# Patient Record
Sex: FEMALE | Race: WHITE | NOT HISPANIC OR LATINO | Employment: OTHER | URBAN - METROPOLITAN AREA
[De-identification: names, ages, dates, MRNs, and addresses within clinical notes are randomized per-mention and may not be internally consistent; named-entity substitution may affect disease eponyms.]

---

## 2017-01-09 ENCOUNTER — OFFICE VISIT (OUTPATIENT)
Dept: FAMILY MEDICINE | Facility: CLINIC | Age: 82
End: 2017-01-09
Payer: MEDICARE

## 2017-01-09 VITALS
BODY MASS INDEX: 26.55 KG/M2 | SYSTOLIC BLOOD PRESSURE: 134 MMHG | TEMPERATURE: 99 F | WEIGHT: 154.63 LBS | HEART RATE: 103 BPM | DIASTOLIC BLOOD PRESSURE: 75 MMHG

## 2017-01-09 DIAGNOSIS — J40 BRONCHITIS: Primary | ICD-10-CM

## 2017-01-09 PROCEDURE — 99213 OFFICE O/P EST LOW 20 MIN: CPT | Mod: S$PBB,,, | Performed by: FAMILY MEDICINE

## 2017-01-09 PROCEDURE — 99999 PR PBB SHADOW E&M-EST. PATIENT-LVL III: CPT | Mod: PBBFAC,,, | Performed by: FAMILY MEDICINE

## 2017-01-09 PROCEDURE — 99213 OFFICE O/P EST LOW 20 MIN: CPT | Mod: PBBFAC,PO | Performed by: FAMILY MEDICINE

## 2017-01-09 RX ORDER — CODEINE PHOSPHATE AND GUAIFENESIN 10; 100 MG/5ML; MG/5ML
5 SOLUTION ORAL EVERY 6 HOURS PRN
Qty: 180 ML | Refills: 0 | Status: SHIPPED | OUTPATIENT
Start: 2017-01-09 | End: 2017-01-19

## 2017-01-09 RX ORDER — AZITHROMYCIN 250 MG/1
250 TABLET, FILM COATED ORAL DAILY
Qty: 6 TABLET | Refills: 0 | Status: SHIPPED | OUTPATIENT
Start: 2017-01-09 | End: 2017-01-14

## 2017-01-09 NOTE — PROGRESS NOTES
Starr Ramos presents with moderate upper respiratory congestion,rhinnorhea,moderate cough past 2-3 days. OTC help slightly. Denies nausea,vomiting,diarrhea or significant fever.    Past Medical History   Diagnosis Date    Diverticulosis     Gallstones     Glaucoma (increased eye pressure)     Hyperlipemia     Thyroid disease      Past Surgical History   Procedure Laterality Date    Hysterectomy      Appendectomy      Cataract extraction w/  intraocular lens implant       OU    Cholecystectomy       Review of patient's allergies indicates:   Allergen Reactions    Augmentin [amoxicillin-pot clavulanate] Nausea And Vomiting    Brimonidine      Other reaction(s): Swelling    Cosopt  [dorzolamide-timolol] Itching and Swelling     Other reaction(s): Eye irritation     Current Outpatient Prescriptions on File Prior to Visit   Medication Sig Dispense Refill    acetaZOLAMIDE (DIAMOX) 500 mg CpSR Take 500 mg by mouth 2 (two) times daily.  1    amitriptyline (ELAVIL) 25 MG tablet TAKE 1 TABLET IN THE EVENING 90 tablet 3    ascorbic acid (VITAMIN C) 250 MG tablet Every day      BETA-CAROTENE,A, W-C & E/MIN (OCUVITE ORAL) Every day      bimatoprost (LUMIGAN) 0.03 % ophthalmic drops At bedtime      COMBIGAN 0.2-0.5 % Drop Place 1 drop into both eyes 2 (two) times daily.  5    cyanocobalamin, vitamin B-12, (VITAMIN B-12) 1,000 mcg TbSR Every day      dorzolamide (TRUSOPT) 2 % ophthalmic solution Place 1 drop into both eyes 2 (two) times daily.  5    folic acid (FOLVITE) 400 MCG tablet Every day      levothyroxine (SYNTHROID) 75 MCG tablet TAKE 1 TABLET EVERY DAY 90 tablet 0    omeprazole (PRILOSEC) 40 MG capsule Take 1 capsule (40 mg total) by mouth once daily. 90 capsule 3    oxybutynin (DITROPAN) 5 MG Tab Take 1 tablet (5 mg total) by mouth every evening. 90 tablet 3    paroxetine (PAXIL) 10 MG tablet TAKE 1 TABLET EVERY MORNING 90 tablet 3    sucralfate (CARAFATE) 1 gram tablet Take 1 tablet (1  g total) by mouth 4 (four) times daily. 120 tablet 11    timolol maleate 0.5% (TIMOPTIC) 0.5 % Drop Place 1 drop into both eyes 2 (two) times daily.  0     No current facility-administered medications on file prior to visit.      Social History     Social History    Marital status:      Spouse name: N/A    Number of children: N/A    Years of education: N/A     Occupational History    Not on file.     Social History Main Topics    Smoking status: Never Smoker    Smokeless tobacco: Never Used    Alcohol use Yes      Comment: rare    Drug use: No    Sexual activity: Not on file     Other Topics Concern    Not on file     Social History Narrative     Family History   Problem Relation Age of Onset    Aneurysm Mother     Heart attack Mother 93    Liver cancer Mother     Aneurysm Sister     Aneurysm Brother     Heart attack Father 75    Diabetes Neg Hx     Hypertension Neg Hx     Stroke Neg Hx          ROS:  SKIN: No rashes, itching or changes in color or texture of skin.  EYES: Visual acuity fine. No photophobia, ocular pain or diplopia.EARS: Denies ear pain, discharge or vertigo.NOSE: No loss of smell, no epistaxis some postnasal drip.MOUTH & THROAT: No hoarseness or change in voice. No excessive gum bleeding.CHEST: Denies FERNANDEZ, cyanosis, wheezing  CARDIOVASCULAR: Denies chest pain, PND, orthopnea or reduced exercise tolerance.  ABDOMEN:  No weight loss.No abdominal pain, no hematemesis or blood in stool.  URINARY: No flank pain, dysuria or hematuria.  PERIPHERAL VASCULAR: No claudication or cyanosis.  MUSCULOSKELETAL: Negative   NEUROLOGIC: No history of seizures, paralysis, alteration of gait or coordination.    PE: Vital signs as noted  Heent:Normocephalic with no recent cranial trauma,PERRLA,EOMI,conjunctiva clear,fundi reveal no hemmorhage exudate or papilledema.Otic canals clear, tympanic membranes slightly dull bilaterally.Nasal mucosa slightly red and edematous.Posterior pharynx slightly  red but without exudate.  Neck:Supple with minimal anterior cervical adenopathy.  Chest:Clear bilateral breath sounds with mild scattered ronchi  Heart:Regular rhthym without murmer  Abdomen:Soft, non tender,no masses, no hepatosplenomegalyExtremeties and Neurologic:Grossly within normal limits  Impression: Upper Respiratory Infection. 465.9  Plan:

## 2017-01-12 ENCOUNTER — TELEPHONE (OUTPATIENT)
Dept: FAMILY MEDICINE | Facility: CLINIC | Age: 82
End: 2017-01-12

## 2017-01-12 RX ORDER — CIPROFLOXACIN 500 MG/1
500 TABLET ORAL 2 TIMES DAILY
Qty: 14 TABLET | Refills: 0 | Status: SHIPPED | OUTPATIENT
Start: 2017-01-12 | End: 2017-01-19

## 2017-01-12 NOTE — TELEPHONE ENCOUNTER
Pt states she has 1 antibiotic left and she is still coughing a lot. She is asking what else she should do? She was seen on 1/9/17

## 2017-01-12 NOTE — TELEPHONE ENCOUNTER
----- Message from Yohana Alejandre sent at 1/12/2017  3:33 PM CST -----  Pt at [phone]//states she saw Dr Sultana on Monday//she has bronchitis//still coughing real bad//would like for you to call her//she only has one pill left of her antibiotic//please call asap//thanks/lh

## 2017-01-19 ENCOUNTER — HOSPITAL ENCOUNTER (OUTPATIENT)
Dept: RADIOLOGY | Facility: HOSPITAL | Age: 82
Discharge: HOME | End: 2017-01-19
Payer: MEDICARE

## 2017-01-19 ENCOUNTER — OFFICE VISIT (OUTPATIENT)
Dept: FAMILY MEDICINE | Facility: CLINIC | Age: 82
End: 2017-01-19
Payer: MEDICARE

## 2017-01-19 VITALS
BODY MASS INDEX: 26.39 KG/M2 | HEIGHT: 64 IN | SYSTOLIC BLOOD PRESSURE: 126 MMHG | TEMPERATURE: 98 F | OXYGEN SATURATION: 98 % | DIASTOLIC BLOOD PRESSURE: 73 MMHG | HEART RATE: 78 BPM | WEIGHT: 154.56 LBS

## 2017-01-19 DIAGNOSIS — J40 BRONCHITIS: ICD-10-CM

## 2017-01-19 DIAGNOSIS — J40 BRONCHITIS: Primary | ICD-10-CM

## 2017-01-19 DIAGNOSIS — J18.9 PNEUMONIA OF LEFT LOWER LOBE DUE TO INFECTIOUS ORGANISM: Primary | ICD-10-CM

## 2017-01-19 PROCEDURE — 71020 XR CHEST PA AND LATERAL: CPT | Mod: 26,,, | Performed by: RADIOLOGY

## 2017-01-19 PROCEDURE — 71020 XR CHEST PA AND LATERAL: CPT | Mod: TC,PO

## 2017-01-19 PROCEDURE — 99213 OFFICE O/P EST LOW 20 MIN: CPT | Mod: S$PBB,,,

## 2017-01-19 PROCEDURE — 99999 PR PBB SHADOW E&M-EST. PATIENT-LVL IV: CPT | Mod: PBBFAC,,,

## 2017-01-19 RX ORDER — METHYLPREDNISOLONE ACETATE 80 MG/ML
80 INJECTION, SUSPENSION INTRA-ARTICULAR; INTRALESIONAL; INTRAMUSCULAR; SOFT TISSUE
Status: COMPLETED | OUTPATIENT
Start: 2017-01-19 | End: 2017-01-19

## 2017-01-19 RX ORDER — LEVOFLOXACIN 500 MG/1
500 TABLET, FILM COATED ORAL DAILY
Qty: 10 TABLET | Refills: 0 | Status: SHIPPED | OUTPATIENT
Start: 2017-01-19 | End: 2017-01-29

## 2017-01-19 RX ORDER — HYDROCODONE POLISTIREX AND CHLORPHENIRAMINE POLISTIREX 10; 8 MG/5ML; MG/5ML
5 SUSPENSION, EXTENDED RELEASE ORAL EVERY 12 HOURS PRN
Qty: 115 ML | Refills: 0 | Status: SHIPPED | OUTPATIENT
Start: 2017-01-19 | End: 2017-01-29

## 2017-01-19 RX ADMIN — METHYLPREDNISOLONE ACETATE 80 MG: 80 INJECTION, SUSPENSION INTRALESIONAL; INTRAMUSCULAR; INTRASYNOVIAL; SOFT TISSUE at 12:01

## 2017-01-19 NOTE — PROGRESS NOTES
Subjective:       Patient ID: Starr Ramos is a 86 y.o. female.    Chief Complaint: Cough    HPI     The patient presents today with a 13 day complaint of a cough that she says is constant and moderate to severe in intensity.  She voices associated nasal congestion and rhinorrhea.  She denies any fever, shortness breath, or wheezing.  The patient has completed both a Z-Ryan and a course of Cipro.  She says her symptoms have not improved at all.  She cannot identify exacerbating or mitigating factors.     Review of Systems   Constitutional: Negative for activity change, appetite change, fatigue, fever and unexpected weight change.   HENT: Positive for congestion and rhinorrhea.    Eyes: Negative.    Respiratory: Positive for cough. Negative for chest tightness, shortness of breath and wheezing.    Cardiovascular: Negative for chest pain, palpitations and leg swelling.   Gastrointestinal: Negative for constipation, diarrhea, nausea and vomiting.   Endocrine: Negative.    Genitourinary: Negative.    Musculoskeletal: Negative.    Skin: Negative for color change.   Allergic/Immunologic: Negative.    Neurological: Negative for dizziness, weakness and light-headedness.   Hematological: Negative.    Psychiatric/Behavioral: Negative for sleep disturbance.         Objective:      Physical Exam   Constitutional: She is oriented to person, place, and time. She appears well-developed and well-nourished. No distress.   HENT:   Head: Normocephalic and atraumatic. Hair is normal.   Right Ear: Hearing, tympanic membrane, external ear and ear canal normal.   Left Ear: Hearing, tympanic membrane, external ear and ear canal normal.   Nose: No mucosal edema, rhinorrhea, nose lacerations, sinus tenderness, nasal deformity, septal deviation or nasal septal hematoma. No epistaxis.  No foreign bodies. Right sinus exhibits no maxillary sinus tenderness and no frontal sinus tenderness. Left sinus exhibits no maxillary sinus tenderness and  no frontal sinus tenderness.   Mouth/Throat: Uvula is midline, oropharynx is clear and moist and mucous membranes are normal.   Eyes: Conjunctivae are normal. Pupils are equal, round, and reactive to light. Right eye exhibits no discharge. Left eye exhibits no discharge.   Neck: Trachea normal, normal range of motion and phonation normal. Neck supple. No tracheal deviation present.   Cardiovascular: Normal rate, regular rhythm, normal heart sounds and intact distal pulses.  Exam reveals no gallop and no friction rub.    No murmur heard.  Pulmonary/Chest: Effort normal. No respiratory distress. She has no decreased breath sounds. She has no wheezes. She has no rhonchi. She has rales in the left middle field. She exhibits no tenderness.   Musculoskeletal: Normal range of motion.   Lymphadenopathy:        Head (right side): No submental, no submandibular, no tonsillar, no preauricular, no posterior auricular and no occipital adenopathy present.        Head (left side): No submental, no submandibular, no tonsillar, no preauricular, no posterior auricular and no occipital adenopathy present.     She has no cervical adenopathy.        Right cervical: No superficial cervical, no deep cervical and no posterior cervical adenopathy present.       Left cervical: No superficial cervical, no deep cervical and no posterior cervical adenopathy present.   Neurological: She is alert and oriented to person, place, and time. She exhibits normal muscle tone. GCS eye subscore is 4. GCS verbal subscore is 5. GCS motor subscore is 6.   Skin: Skin is warm and dry. No rash noted. She is not diaphoretic. No erythema. No pallor.   Psychiatric: She has a normal mood and affect. Her speech is normal and behavior is normal. Judgment and thought content normal.       Assessment:       1. Bronchitis          Plan:   Bronchitis  -     methylPREDNISolone acetate injection 80 mg; Inject 1 mL (80 mg total) into the muscle one time.  -      hydrocodone-chlorpheniramine (TUSSIONEX) 10-8 mg/5 mL suspension; Take 5 mLs by mouth every 12 (twelve) hours as needed.  Dispense: 115 mL; Refill: 0  -     X-Ray Chest PA And Lateral; Future; Expected date: 1/19/17  -     levoFLOXacin (LEVAQUIN) 500 MG tablet; Take 1 tablet (500 mg total) by mouth once daily.  Dispense: 10 tablet; Refill: 0            Disclaimer: This note is prepared using voice recognition software.  As such there may be errors in the dictation.  It has not been proofread.

## 2017-01-19 NOTE — MR AVS SNAPSHOT
[facility]  [address]  [place] [address]  Phone: [phone]  Fax: [phone]                  Starr Ramos   2017 12:00 PM   Office Visit    Description:  Female : 1930   Provider:  ERNESTO Cole   Department:  [facility]           Reason for Visit     Cough           Diagnoses this Visit        Comments    Bronchitis    -  Primary            To Do List           Future Appointments        Provider Department Dept Phone    2017 1:00 PM [facility] XR[facility] [phone]      Goals (5 Years of Data)     None       These Medications        Disp Refills Start End    hydrocodone-chlorpheniramine (TUSSIONEX) 10-8 mg/5 mL suspension 115 mL 0 2017    Take 5 mLs by mouth every 12 (twelve) hours as needed. - Oral    Pharmacy: [facility]/pharmacy #[id number] - [place] - [address] Ph #: [phone]         [facility]     [facility] -  Assistance  Registered nurses in the [facility] provide clinical advisement, health education, appointment booking, and other advisory services.  Call for this free service at [phone].             Medications           Message regarding Medications     Verify the changes and/or additions to your medication regime listed below are the same as discussed with your clinician today.  If any of these changes or additions are incorrect, please notify your healthcare provider.        START taking these NEW medications        Refills    hydrocodone-chlorpheniramine (TUSSIONEX) 10-8 mg/5 mL suspension 0    Sig: Take 5 mLs by mouth every 12 (twelve) hours as needed.    Class: Normal    Route: Oral      These medications were administered today        Dose Freq    methylPREDNISolone acetate injection 80 mg 80 mg Clinic/HOD 1 time    Sig: Inject 1 mL (80 mg total) into the muscle one time.    Class:  Normal    Route: Intramuscular      STOP taking these medications     guaifenesin-codeine 100-10 mg/5 ml (TUSSI-ORGANIDIN NR)  mg/5 mL syrup Take 5 mLs by mouth every 6 (six) hours as needed for Cough.           Verify that the below list of medications is an accurate representation of the medications you are currently taking.  If none reported, the list may be blank. If incorrect, please contact your healthcare provider. Carry this list with you in case of emergency.           Current Medications     acetaZOLAMIDE (DIAMOX) 500 mg CpSR Take 500 mg by mouth 2 (two) times daily.    amitriptyline (ELAVIL) 25 MG tablet TAKE 1 TABLET IN THE EVENING    ascorbic acid (VITAMIN C) 250 MG tablet Every day    BETA-CAROTENE,A, W-C & E/MIN (OCUVITE ORAL) Every day    bimatoprost (LUMIGAN) 0.03 % ophthalmic drops At bedtime    COMBIGAN 0.2-0.5 % Drop Place 1 drop into both eyes 2 (two) times daily.    cyanocobalamin, vitamin B-12, (VITAMIN B-12) 1,000 mcg TbSR Every day    dorzolamide (TRUSOPT) 2 % ophthalmic solution Place 1 drop into both eyes 2 (two) times daily.    folic acid (FOLVITE) 400 MCG tablet Every day    levothyroxine (SYNTHROID) 75 MCG tablet TAKE 1 TABLET EVERY DAY    omeprazole (PRILOSEC) 40 MG capsule Take 1 capsule (40 mg total) by mouth once daily.    paroxetine (PAXIL) 10 MG tablet TAKE 1 TABLET EVERY MORNING    sucralfate (CARAFATE) 1 gram tablet Take 1 tablet (1 g total) by mouth 4 (four) times daily.    timolol maleate 0.5% (TIMOPTIC) 0.5 % Drop Place 1 drop into both eyes 2 (two) times daily.    ciprofloxacin HCl (CIPRO) 500 MG tablet Take 1 tablet (500 mg total) by mouth 2 (two) times daily.    hydrocodone-chlorpheniramine (TUSSIONEX) 10-8 mg/5 mL suspension Take 5 mLs by mouth every 12 (twelve) hours as needed.    oxybutynin (DITROPAN) 5 MG Tab Take 1 tablet (5 mg total) by mouth every evening.           Clinical Reference Information           Vital Signs - Last Recorded  Most recent update:  "1/19/2017 12:33 PM by Felicitas Kumar MA    BP Pulse Temp Ht Wt SpO2    126/73 78 97.9 °F (36.6 °C) (Oral) 5' 4" (1.626 m) 70.1 kg (154 lb 8.7 oz) 98%    BMI                26.53 kg/m2          Blood Pressure          Most Recent Value    BP  126/73      Allergies as of 1/19/2017     Augmentin [Amoxicillin-pot Clavulanate]    Brimonidine    Cosopt  [Dorzolamide-timolol]      Immunizations Administered on Date of Encounter - 1/19/2017     None      Orders Placed During Today's Visit     Future Labs/Procedures Expected by Expires    X-Ray Chest PA And Lateral  1/19/2017 1/20/2018      Administrations This Visit     methylPREDNISolone acetate injection 80 mg     Admin Date Action Dose Route Administered By             01/19/2017 Given 80 mg Intramuscular Awilda Scales LPN                      "

## 2017-01-30 ENCOUNTER — TELEPHONE (OUTPATIENT)
Dept: FAMILY MEDICINE | Facility: CLINIC | Age: 82
End: 2017-01-30

## 2017-01-30 DIAGNOSIS — Z23 IMMUNIZATION DUE: Primary | ICD-10-CM

## 2017-01-30 NOTE — TELEPHONE ENCOUNTER
----- Message from Sloane Gibson sent at 1/30/2017  9:37 AM CST -----  Contact: Starr  Patient is asking to speak to Mary Anne regarding the appointment as per Alcides Bacon for x-ray to be done. Please call [phone]. Thanks!

## 2017-01-31 ENCOUNTER — HOSPITAL ENCOUNTER (OUTPATIENT)
Dept: RADIOLOGY | Facility: HOSPITAL | Age: 82
Discharge: HOME | End: 2017-01-31
Payer: MEDICARE

## 2017-01-31 ENCOUNTER — CLINICAL SUPPORT (OUTPATIENT)
Dept: FAMILY MEDICINE | Facility: CLINIC | Age: 82
End: 2017-01-31
Payer: MEDICARE

## 2017-01-31 DIAGNOSIS — J18.9 PNEUMONIA OF LEFT LOWER LOBE DUE TO INFECTIOUS ORGANISM: ICD-10-CM

## 2017-01-31 DIAGNOSIS — Z23 NEED FOR VACCINATION WITH 13-POLYVALENT PNEUMOCOCCAL CONJUGATE VACCINE: Primary | ICD-10-CM

## 2017-01-31 PROCEDURE — 71020 XR CHEST PA AND LATERAL: CPT | Mod: 26,,, | Performed by: RADIOLOGY

## 2017-01-31 PROCEDURE — 90670 PCV13 VACCINE IM: CPT | Mod: PBBFAC,PO

## 2017-01-31 NOTE — PROGRESS NOTES
Pt instructed to wait for 15 minutes in patient waiting area to monitor for side effects.  Pt received Prevnar 13 injection IM to right deltoid.  Pt tolerated well

## 2017-02-03 ENCOUNTER — TELEPHONE (OUTPATIENT)
Dept: FAMILY MEDICINE | Facility: CLINIC | Age: 82
End: 2017-02-03

## 2017-02-03 NOTE — TELEPHONE ENCOUNTER
No active pneumonia, area of scarring which is an old finding. If she's feeling well no fu needed for this-if still out of breath rec Pulm con

## 2017-02-03 NOTE — TELEPHONE ENCOUNTER
----- Message from Jinny Branham sent at 2/3/2017  8:59 AM CST -----  results needed...[phone] (home)

## 2017-03-22 RX ORDER — PAROXETINE 10 MG/1
TABLET, FILM COATED ORAL
Qty: 90 TABLET | Refills: 3 | Status: SHIPPED | OUTPATIENT
Start: 2017-03-22 | End: 2018-04-19 | Stop reason: SDUPTHER

## 2017-04-15 RX ORDER — AMITRIPTYLINE HYDROCHLORIDE 25 MG/1
TABLET, FILM COATED ORAL
Qty: 90 TABLET | Refills: 3 | Status: SHIPPED | OUTPATIENT
Start: 2017-04-15 | End: 2018-04-05 | Stop reason: SDUPTHER

## 2017-07-09 RX ORDER — LEVOTHYROXINE SODIUM 75 UG/1
TABLET ORAL
Qty: 90 TABLET | Refills: 4 | Status: SHIPPED | OUTPATIENT
Start: 2017-07-09 | End: 2017-07-31

## 2017-07-31 ENCOUNTER — OFFICE VISIT (OUTPATIENT)
Dept: FAMILY MEDICINE | Facility: CLINIC | Age: 82
End: 2017-07-31
Payer: MEDICARE

## 2017-07-31 VITALS
DIASTOLIC BLOOD PRESSURE: 75 MMHG | HEIGHT: 64 IN | SYSTOLIC BLOOD PRESSURE: 137 MMHG | BODY MASS INDEX: 25.35 KG/M2 | HEART RATE: 84 BPM | WEIGHT: 148.5 LBS | TEMPERATURE: 98 F

## 2017-07-31 DIAGNOSIS — J06.9 UPPER RESPIRATORY TRACT INFECTION, UNSPECIFIED TYPE: Primary | ICD-10-CM

## 2017-07-31 PROCEDURE — 1157F ADVNC CARE PLAN IN RCRD: CPT | Mod: ,,, | Performed by: FAMILY MEDICINE

## 2017-07-31 PROCEDURE — 1159F MED LIST DOCD IN RCRD: CPT | Mod: ,,, | Performed by: FAMILY MEDICINE

## 2017-07-31 PROCEDURE — 1126F AMNT PAIN NOTED NONE PRSNT: CPT | Mod: ,,, | Performed by: FAMILY MEDICINE

## 2017-07-31 PROCEDURE — 99213 OFFICE O/P EST LOW 20 MIN: CPT | Mod: S$PBB,,, | Performed by: FAMILY MEDICINE

## 2017-07-31 PROCEDURE — 99213 OFFICE O/P EST LOW 20 MIN: CPT | Mod: PBBFAC,PO | Performed by: FAMILY MEDICINE

## 2017-07-31 PROCEDURE — 96372 THER/PROPH/DIAG INJ SC/IM: CPT | Mod: PBBFAC,PO

## 2017-07-31 PROCEDURE — 99999 PR PBB SHADOW E&M-EST. PATIENT-LVL III: CPT | Mod: PBBFAC,,, | Performed by: FAMILY MEDICINE

## 2017-07-31 RX ORDER — DEXAMETHASONE SODIUM PHOSPHATE 4 MG/ML
8 INJECTION, SOLUTION INTRA-ARTICULAR; INTRALESIONAL; INTRAMUSCULAR; INTRAVENOUS; SOFT TISSUE ONCE
Status: COMPLETED | OUTPATIENT
Start: 2017-07-31 | End: 2017-07-31

## 2017-07-31 RX ORDER — MONTELUKAST SODIUM 10 MG/1
10 TABLET ORAL NIGHTLY
Qty: 30 TABLET | Refills: 11 | Status: SHIPPED | OUTPATIENT
Start: 2017-07-31 | End: 2017-08-30

## 2017-07-31 RX ORDER — CODEINE PHOSPHATE AND GUAIFENESIN 10; 100 MG/5ML; MG/5ML
5 SOLUTION ORAL EVERY 6 HOURS PRN
Qty: 240 ML | Refills: 0 | Status: SHIPPED | OUTPATIENT
Start: 2017-07-31 | End: 2017-08-10

## 2017-07-31 RX ADMIN — DEXAMETHASONE SODIUM PHOSPHATE 8 MG: 4 INJECTION, SOLUTION INTRAMUSCULAR; INTRAVENOUS at 10:07

## 2017-07-31 NOTE — PROGRESS NOTES
Starr Ramos presents with moderate upper respiratory congestion,rhinnorhea,moderate cough past 2-3 days. OTC help slightly. Denies nausea,vomiting,diarrhea or significant fever.    Past Medical History:   Diagnosis Date    Diverticulosis     Gallstones     Glaucoma (increased eye pressure)     Hyperlipemia     Thyroid disease      Past Surgical History:   Procedure Laterality Date    APPENDECTOMY      CATARACT EXTRACTION W/  INTRAOCULAR LENS IMPLANT      OU    CHOLECYSTECTOMY      HYSTERECTOMY       Review of patient's allergies indicates:   Allergen Reactions    Augmentin [amoxicillin-pot clavulanate] Nausea And Vomiting    Brimonidine      Other reaction(s): Swelling    Cosopt  [dorzolamide-timolol] Itching and Swelling     Other reaction(s): Eye irritation     Current Outpatient Prescriptions on File Prior to Visit   Medication Sig Dispense Refill    acetaZOLAMIDE (DIAMOX) 500 mg CpSR Take 500 mg by mouth 2 (two) times daily.  1    amitriptyline (ELAVIL) 25 MG tablet TAKE 1 TABLET IN THE EVENING 90 tablet 3    ascorbic acid (VITAMIN C) 250 MG tablet Every day      BETA-CAROTENE,A, W-C & E/MIN (OCUVITE ORAL) Every day      bimatoprost (LUMIGAN) 0.03 % ophthalmic drops At bedtime      COMBIGAN 0.2-0.5 % Drop Place 1 drop into both eyes 2 (two) times daily.  5    cyanocobalamin, vitamin B-12, (VITAMIN B-12) 1,000 mcg TbSR Every day      dorzolamide (TRUSOPT) 2 % ophthalmic solution Place 1 drop into both eyes 2 (two) times daily.  5    folic acid (FOLVITE) 400 MCG tablet Every day      levothyroxine (SYNTHROID) 75 MCG tablet TAKE 1 TABLET EVERY DAY 90 tablet 0    omeprazole (PRILOSEC) 40 MG capsule Take 1 capsule (40 mg total) by mouth once daily. 90 capsule 3    paroxetine (PAXIL) 10 MG tablet TAKE 1 TABLET EVERY MORNING 90 tablet 3    sucralfate (CARAFATE) 1 gram tablet Take 1 tablet (1 g total) by mouth 4 (four) times daily. 120 tablet 11    timolol maleate 0.5% (TIMOPTIC) 0.5 %  Drop Place 1 drop into both eyes 2 (two) times daily.  0    oxybutynin (DITROPAN) 5 MG Tab Take 1 tablet (5 mg total) by mouth every evening. 90 tablet 3    [DISCONTINUED] levothyroxine (SYNTHROID) 75 MCG tablet TAKE 1 TABLET EVERY DAY 90 tablet 4     No current facility-administered medications on file prior to visit.      Social History     Social History    Marital status:      Spouse name: N/A    Number of children: N/A    Years of education: N/A     Occupational History    Not on file.     Social History Main Topics    Smoking status: Never Smoker    Smokeless tobacco: Never Used    Alcohol use Yes      Comment: rare    Drug use: No    Sexual activity: Not on file     Other Topics Concern    Not on file     Social History Narrative    No narrative on file     Family History   Problem Relation Age of Onset    Aneurysm Mother     Heart attack Mother 93    Liver cancer Mother     Aneurysm Sister     Aneurysm Brother     Heart attack Father 75    Diabetes Neg Hx     Hypertension Neg Hx     Stroke Neg Hx          ROS:  SKIN: No rashes, itching or changes in color or texture of skin.  EYES: Visual acuity fine. No photophobia, ocular pain or diplopia.EARS: Denies ear pain, discharge or vertigo.NOSE: No loss of smell, no epistaxis some postnasal drip.MOUTH & THROAT: No hoarseness or change in voice. No excessive gum bleeding.CHEST: Denies FERNANDEZ, cyanosis, wheezing  CARDIOVASCULAR: Denies chest pain, PND, orthopnea or reduced exercise tolerance.  ABDOMEN:  No weight loss.No abdominal pain, no hematemesis or blood in stool.  URINARY: No flank pain, dysuria or hematuria.  PERIPHERAL VASCULAR: No claudication or cyanosis.  MUSCULOSKELETAL: Negative   NEUROLOGIC: No history of seizures, paralysis, alteration of gait or coordination.    PE: Vital signs as noted  Heent:Normocephalic with no recent cranial trauma,PERRLA,EOMI,conjunctiva clear,fundi reveal no hemmorhage exudate or papilledema.Otic  canals clear, tympanic membranes slightly dull bilaterally.Nasal mucosa slightly red and edematous.Posterior pharynx slightly red but without exudate.  Neck:Supple with minimal anterior cervical adenopathy.  Chest:Clear bilateral breath sounds with mild scattered ronchi  Heart:Regular rhthym without murmer  Abdomen:Soft, non tender,no masses, no hepatosplenomegalyExtremeties and Neurologic:Grossly within normal limits  Impression: Upper Respiratory Infection. 465.9  Plan:   Singulair Dexa 8 TO w cod

## 2017-08-03 ENCOUNTER — OFFICE VISIT (OUTPATIENT)
Dept: FAMILY MEDICINE | Facility: CLINIC | Age: 82
End: 2017-08-03
Payer: MEDICARE

## 2017-08-03 ENCOUNTER — HOSPITAL ENCOUNTER (OUTPATIENT)
Dept: RADIOLOGY | Facility: HOSPITAL | Age: 82
Discharge: HOME | End: 2017-08-03
Attending: FAMILY MEDICINE
Payer: MEDICARE

## 2017-08-03 VITALS
OXYGEN SATURATION: 96 % | DIASTOLIC BLOOD PRESSURE: 67 MMHG | HEART RATE: 111 BPM | SYSTOLIC BLOOD PRESSURE: 119 MMHG | TEMPERATURE: 100 F | HEIGHT: 66 IN | BODY MASS INDEX: 24.1 KG/M2 | WEIGHT: 149.94 LBS

## 2017-08-03 DIAGNOSIS — J40 BRONCHITIS: Primary | ICD-10-CM

## 2017-08-03 DIAGNOSIS — J40 BRONCHITIS: ICD-10-CM

## 2017-08-03 PROCEDURE — 71020 XR CHEST PA AND LATERAL: CPT | Mod: TC,PO

## 2017-08-03 PROCEDURE — 3008F BODY MASS INDEX DOCD: CPT | Mod: ,,, | Performed by: FAMILY MEDICINE

## 2017-08-03 PROCEDURE — 1159F MED LIST DOCD IN RCRD: CPT | Mod: ,,, | Performed by: FAMILY MEDICINE

## 2017-08-03 PROCEDURE — 99999 PR PBB SHADOW E&M-EST. PATIENT-LVL IV: CPT | Mod: PBBFAC,,, | Performed by: FAMILY MEDICINE

## 2017-08-03 PROCEDURE — 1157F ADVNC CARE PLAN IN RCRD: CPT | Mod: ,,, | Performed by: FAMILY MEDICINE

## 2017-08-03 PROCEDURE — 71020 XR CHEST PA AND LATERAL: CPT | Mod: 26,,, | Performed by: RADIOLOGY

## 2017-08-03 PROCEDURE — 99213 OFFICE O/P EST LOW 20 MIN: CPT | Mod: S$PBB,,, | Performed by: FAMILY MEDICINE

## 2017-08-03 RX ORDER — METHYLPREDNISOLONE 4 MG/1
TABLET ORAL
Qty: 1 PACKAGE | Refills: 0 | Status: SHIPPED | OUTPATIENT
Start: 2017-08-03 | End: 2017-08-24

## 2017-08-03 RX ORDER — DOXYCYCLINE HYCLATE 100 MG
100 TABLET ORAL 2 TIMES DAILY
Qty: 20 TABLET | Refills: 0 | Status: SHIPPED | OUTPATIENT
Start: 2017-08-03 | End: 2017-08-13

## 2017-08-03 NOTE — PROGRESS NOTES
Starr Ramos presents with moderate upper respiratory congestion,rhinnorhea,moderate cough past 2-3 days. OTC help slightly. Denies nausea,vomiting,diarrhea or significant fever.    Past Medical History:   Diagnosis Date    Diverticulosis     Gallstones     Glaucoma (increased eye pressure)     Hyperlipemia     Thyroid disease      Past Surgical History:   Procedure Laterality Date    APPENDECTOMY      CATARACT EXTRACTION W/  INTRAOCULAR LENS IMPLANT      OU    CHOLECYSTECTOMY      HYSTERECTOMY       Review of patient's allergies indicates:   Allergen Reactions    Augmentin [amoxicillin-pot clavulanate] Nausea And Vomiting    Brimonidine      Other reaction(s): Swelling    Cosopt  [dorzolamide-timolol] Itching and Swelling     Other reaction(s): Eye irritation     Current Outpatient Prescriptions on File Prior to Visit   Medication Sig Dispense Refill    acetaZOLAMIDE (DIAMOX) 500 mg CpSR Take 500 mg by mouth 2 (two) times daily.  1    amitriptyline (ELAVIL) 25 MG tablet TAKE 1 TABLET IN THE EVENING 90 tablet 3    ascorbic acid (VITAMIN C) 250 MG tablet Every day      BETA-CAROTENE,A, W-C & E/MIN (OCUVITE ORAL) Every day      bimatoprost (LUMIGAN) 0.03 % ophthalmic drops At bedtime      COMBIGAN 0.2-0.5 % Drop Place 1 drop into both eyes 2 (two) times daily.  5    cyanocobalamin, vitamin B-12, (VITAMIN B-12) 1,000 mcg TbSR Every day      dorzolamide (TRUSOPT) 2 % ophthalmic solution Place 1 drop into both eyes 2 (two) times daily.  5    folic acid (FOLVITE) 400 MCG tablet Every day      guaifenesin-codeine 100-10 mg/5 ml (TUSSI-ORGANIDIN NR)  mg/5 mL syrup Take 5 mLs by mouth every 6 (six) hours as needed for Cough. 240 mL 0    levothyroxine (SYNTHROID) 75 MCG tablet TAKE 1 TABLET EVERY DAY 90 tablet 0    montelukast (SINGULAIR) 10 mg tablet Take 1 tablet (10 mg total) by mouth every evening. 30 tablet 11    omeprazole (PRILOSEC) 40 MG capsule Take 1 capsule (40 mg total) by  mouth once daily. 90 capsule 3    oxybutynin (DITROPAN) 5 MG Tab Take 1 tablet (5 mg total) by mouth every evening. 90 tablet 3    paroxetine (PAXIL) 10 MG tablet TAKE 1 TABLET EVERY MORNING 90 tablet 3    sucralfate (CARAFATE) 1 gram tablet Take 1 tablet (1 g total) by mouth 4 (four) times daily. 120 tablet 11    timolol maleate 0.5% (TIMOPTIC) 0.5 % Drop Place 1 drop into both eyes 2 (two) times daily.  0     No current facility-administered medications on file prior to visit.      Social History     Social History    Marital status:      Spouse name: N/A    Number of children: N/A    Years of education: N/A     Occupational History    Not on file.     Social History Main Topics    Smoking status: Never Smoker    Smokeless tobacco: Never Used    Alcohol use Yes      Comment: rare    Drug use: No    Sexual activity: Not on file     Other Topics Concern    Not on file     Social History Narrative    No narrative on file     Family History   Problem Relation Age of Onset    Aneurysm Mother     Heart attack Mother 93    Liver cancer Mother     Aneurysm Sister     Aneurysm Brother     Heart attack Father 75    Diabetes Neg Hx     Hypertension Neg Hx     Stroke Neg Hx          ROS:  SKIN: No rashes, itching or changes in color or texture of skin.  EYES: Visual acuity fine. No photophobia, ocular pain or diplopia.EARS: Denies ear pain, discharge or vertigo.NOSE: No loss of smell, no epistaxis some postnasal drip.MOUTH & THROAT: No hoarseness or change in voice. No excessive gum bleeding.CHEST: Denies FERNANDEZ, cyanosis, wheezing  CARDIOVASCULAR: Denies chest pain, PND, orthopnea or reduced exercise tolerance.  ABDOMEN:  No weight loss.No abdominal pain, no hematemesis or blood in stool.  URINARY: No flank pain, dysuria or hematuria.  PERIPHERAL VASCULAR: No claudication or cyanosis.  MUSCULOSKELETAL: Negative   NEUROLOGIC: No history of seizures, paralysis, alteration of gait or  coordination.    PE: Vital signs as noted  Heent:Normocephalic with no recent cranial trauma,PERRLA,EOMI,conjunctiva clear,fundi reveal no hemmorhage exudate or papilledema.Otic canals clear, tympanic membranes slightly dull bilaterally.Nasal mucosa slightly red and edematous.Posterior pharynx slightly red but without exudate.  Neck:Supple with minimal anterior cervical adenopathy.  Chest:Clear bilateral breath sounds with mild scattered ronchi  Heart:Regular rhthym without murmer  Abdomen:Soft, non tender,no masses, no hepatosplenomegalyExtremeties and Neurologic:Grossly within normal limits  Impression: Upper Respiratory Infection. 465.9  Plan: Doxy 100 bid   Medrol dspk

## 2017-08-07 ENCOUNTER — TELEPHONE (OUTPATIENT)
Dept: FAMILY MEDICINE | Facility: CLINIC | Age: 82
End: 2017-08-07

## 2017-08-07 RX ORDER — ALBUTEROL SULFATE 90 UG/1
2 AEROSOL, METERED RESPIRATORY (INHALATION) EVERY 6 HOURS PRN
Qty: 18 G | Refills: 2 | Status: SHIPPED | OUTPATIENT
Start: 2017-08-07 | End: 2019-02-04

## 2017-08-07 NOTE — TELEPHONE ENCOUNTER
----- Message from Jael Werner sent at 8/7/2017  8:22 AM CDT -----  Contact: [phone]  Would like to consult with nurse about constant coughing has questions about treatment plan. Please call back at [phone]. x. lj

## 2017-08-07 NOTE — TELEPHONE ENCOUNTER
Pt states she has finish the steriod pack and cough meds. She is still coughing badly with thick mucous. She is asking if you think an inhaler would work or what you would recommend.

## 2017-08-21 ENCOUNTER — HOSPITAL ENCOUNTER (OUTPATIENT)
Dept: RADIOLOGY | Facility: HOSPITAL | Age: 82
Discharge: HOME | End: 2017-08-21
Attending: INTERNAL MEDICINE
Payer: MEDICARE

## 2017-08-21 DIAGNOSIS — R05.9 COUGH: ICD-10-CM

## 2017-08-21 PROCEDURE — 71020 XR CHEST PA AND LATERAL: CPT | Mod: TC,PO

## 2017-08-21 PROCEDURE — 71020 XR CHEST PA AND LATERAL: CPT | Mod: 26,,, | Performed by: RADIOLOGY

## 2017-08-22 ENCOUNTER — APPOINTMENT (OUTPATIENT)
Dept: LAB | Facility: HOSPITAL | Age: 82
End: 2017-08-22
Attending: INTERNAL MEDICINE
Payer: MEDICARE

## 2017-08-22 DIAGNOSIS — R05.9 COUGH: Primary | ICD-10-CM

## 2017-08-22 PROCEDURE — 87070 CULTURE OTHR SPECIMN AEROBIC: CPT

## 2017-08-22 PROCEDURE — 87186 SC STD MICRODIL/AGAR DIL: CPT

## 2017-08-22 PROCEDURE — 87077 CULTURE AEROBIC IDENTIFY: CPT

## 2017-08-22 PROCEDURE — 87205 SMEAR GRAM STAIN: CPT

## 2017-08-24 LAB
BACTERIA SPEC AEROBE CULT: NORMAL
GRAM STN SPEC: NORMAL

## 2017-09-06 ENCOUNTER — HOSPITAL ENCOUNTER (OUTPATIENT)
Dept: RADIOLOGY | Facility: HOSPITAL | Age: 82
Discharge: HOME | End: 2017-09-06
Attending: NURSE PRACTITIONER
Payer: MEDICARE

## 2017-09-06 DIAGNOSIS — J18.9 UNRESOLVED PNEUMONIA: ICD-10-CM

## 2017-09-06 DIAGNOSIS — A49.8 NECROBACILLOSIS: ICD-10-CM

## 2017-09-06 PROCEDURE — 71020 XR CHEST PA AND LATERAL: CPT | Mod: 26,,, | Performed by: RADIOLOGY

## 2017-09-06 PROCEDURE — 71020 XR CHEST PA AND LATERAL: CPT | Mod: TC,PO

## 2017-09-18 RX ORDER — OMEPRAZOLE 40 MG/1
CAPSULE, DELAYED RELEASE ORAL
Qty: 90 CAPSULE | Refills: 3 | Status: SHIPPED | OUTPATIENT
Start: 2017-09-18 | End: 2018-09-13 | Stop reason: SDUPTHER

## 2017-11-02 ENCOUNTER — HOSPITAL ENCOUNTER (OUTPATIENT)
Dept: RADIOLOGY | Facility: HOSPITAL | Age: 82
Discharge: HOME | End: 2017-11-02
Attending: NURSE PRACTITIONER
Payer: MEDICARE

## 2017-11-02 DIAGNOSIS — J18.9 PNEUMONIA, ORGANISM UNSPECIFIED(486): ICD-10-CM

## 2017-11-02 PROCEDURE — 71020 XR CHEST PA AND LATERAL: CPT | Mod: 26,,, | Performed by: RADIOLOGY

## 2017-11-02 PROCEDURE — 71020 XR CHEST PA AND LATERAL: CPT | Mod: TC,PO

## 2018-04-05 RX ORDER — AMITRIPTYLINE HYDROCHLORIDE 25 MG/1
TABLET, FILM COATED ORAL
Qty: 90 TABLET | Refills: 3 | Status: SHIPPED | OUTPATIENT
Start: 2018-04-05 | End: 2019-04-06 | Stop reason: SDUPTHER

## 2018-04-19 RX ORDER — PAROXETINE 10 MG/1
TABLET, FILM COATED ORAL
Qty: 90 TABLET | Refills: 3 | Status: SHIPPED | OUTPATIENT
Start: 2018-04-19 | End: 2019-01-28 | Stop reason: ALTCHOICE

## 2018-07-11 RX ORDER — LEVOTHYROXINE SODIUM 75 UG/1
TABLET ORAL
Qty: 90 TABLET | Refills: 3 | Status: SHIPPED | OUTPATIENT
Start: 2018-07-11

## 2018-09-13 RX ORDER — OMEPRAZOLE 40 MG/1
CAPSULE, DELAYED RELEASE ORAL
Qty: 90 CAPSULE | Refills: 3 | Status: SHIPPED | OUTPATIENT
Start: 2018-09-13

## 2018-10-16 ENCOUNTER — PATIENT OUTREACH (OUTPATIENT)
Dept: ADMINISTRATIVE | Facility: HOSPITAL | Age: 83
End: 2018-10-16

## 2018-10-16 NOTE — PROGRESS NOTES
Flu and pneumonia shot given 10/11/2018. Immunization and HM update.  Immunization report sent to scanning.

## 2019-01-21 ENCOUNTER — OFFICE VISIT (OUTPATIENT)
Dept: FAMILY MEDICINE | Facility: CLINIC | Age: 84
End: 2019-01-21
Payer: MEDICARE

## 2019-01-21 VITALS
BODY MASS INDEX: 23.14 KG/M2 | HEIGHT: 66 IN | TEMPERATURE: 99 F | HEART RATE: 78 BPM | WEIGHT: 144 LBS | DIASTOLIC BLOOD PRESSURE: 67 MMHG | SYSTOLIC BLOOD PRESSURE: 114 MMHG

## 2019-01-21 DIAGNOSIS — R07.9 CHEST PAIN, UNSPECIFIED TYPE: ICD-10-CM

## 2019-01-21 DIAGNOSIS — K21.9 GASTROESOPHAGEAL REFLUX DISEASE, ESOPHAGITIS PRESENCE NOT SPECIFIED: ICD-10-CM

## 2019-01-21 DIAGNOSIS — R10.13 EPIGASTRIC PAIN: Primary | ICD-10-CM

## 2019-01-21 PROCEDURE — 99213 PR OFFICE/OUTPT VISIT, EST, LEVL III, 20-29 MIN: ICD-10-PCS | Mod: S$PBB,,, | Performed by: NURSE PRACTITIONER

## 2019-01-21 PROCEDURE — 99214 OFFICE O/P EST MOD 30 MIN: CPT | Mod: PBBFAC,PO,25 | Performed by: NURSE PRACTITIONER

## 2019-01-21 PROCEDURE — 93010 EKG 12-LEAD: ICD-10-PCS | Mod: ,,, | Performed by: NUCLEAR MEDICINE

## 2019-01-21 PROCEDURE — 99213 OFFICE O/P EST LOW 20 MIN: CPT | Mod: S$PBB,,, | Performed by: NURSE PRACTITIONER

## 2019-01-21 PROCEDURE — 99999 PR PBB SHADOW E&M-EST. PATIENT-LVL IV: ICD-10-PCS | Mod: PBBFAC,,, | Performed by: NURSE PRACTITIONER

## 2019-01-21 PROCEDURE — 93005 ELECTROCARDIOGRAM TRACING: CPT | Mod: PBBFAC,PO | Performed by: NURSE PRACTITIONER

## 2019-01-21 PROCEDURE — 99999 PR PBB SHADOW E&M-EST. PATIENT-LVL IV: CPT | Mod: PBBFAC,,, | Performed by: NURSE PRACTITIONER

## 2019-01-21 PROCEDURE — 93010 ELECTROCARDIOGRAM REPORT: CPT | Mod: ,,, | Performed by: NUCLEAR MEDICINE

## 2019-01-21 RX ORDER — SUCRALFATE 1 G/1
1 TABLET ORAL 4 TIMES DAILY
Qty: 40 TABLET | Refills: 0 | Status: SHIPPED | OUTPATIENT
Start: 2019-01-21 | End: 2019-01-31

## 2019-01-21 NOTE — PROGRESS NOTES
Subjective:       Patient ID: Starr Ramos is a 88 y.o. female.    Chief Complaint: Abdominal Pain    Abdominal Pain   This is a new problem. The current episode started in the past 7 days (x 3 d). The onset quality is gradual. The problem occurs intermittently. The problem has been unchanged. The pain is located in the epigastric region. The pain is mild. The quality of the pain is aching. The abdominal pain radiates to the chest. Associated symptoms include belching. Pertinent negatives include no anorexia, arthralgias, constipation, diarrhea, dysuria, fever, flatus, frequency, headaches, hematochezia, hematuria, melena, myalgias, nausea, vomiting or weight loss. Nothing aggravates the pain. The pain is relieved by nothing. She has tried proton pump inhibitors for the symptoms. The treatment provided no relief. Prior workup: GI referral, CT scan recommended; declined at present; states will consider if symptoms persist. Her past medical history is significant for GERD and PUD. There is no history of abdominal surgery, colon cancer, Crohn's disease, gallstones, irritable bowel syndrome, pancreatitis or ulcerative colitis. Patient's medical history does not include kidney stones and UTI.     Past Medical History:   Diagnosis Date    Diverticulosis     Gallstones     Glaucoma (increased eye pressure)     Hyperlipemia     Thyroid disease      Social History     Socioeconomic History    Marital status:      Spouse name: Not on file    Number of children: Not on file    Years of education: Not on file    Highest education level: Not on file   Social Needs    Financial resource strain: Not on file    Food insecurity - worry: Not on file    Food insecurity - inability: Not on file    Transportation needs - medical: Not on file    Transportation needs - non-medical: Not on file   Occupational History    Not on file   Tobacco Use    Smoking status: Never Smoker    Smokeless tobacco: Never Used    Substance and Sexual Activity    Alcohol use: Yes     Comment: rare    Drug use: No    Sexual activity: Not on file   Other Topics Concern    Not on file   Social History Narrative    Not on file     Past Surgical History:   Procedure Laterality Date    APPENDECTOMY      CATARACT EXTRACTION W/  INTRAOCULAR LENS IMPLANT      OU    CHOLECYSTECTOMY      EGD (ESOPHAGOGASTRODUODENOSCOPY) N/A 5/15/2013    Performed by Vel Prince Jr[facility] at [facility] ENDO    HYSTERECTOMY         Review of Systems   Constitutional: Negative.  Negative for fever and weight loss.   HENT: Negative.    Eyes: Negative.    Respiratory: Negative.    Cardiovascular: Negative.    Gastrointestinal: Positive for abdominal pain. Negative for anorexia, constipation, diarrhea, flatus, hematochezia, melena, nausea and vomiting.   Endocrine: Negative.    Genitourinary: Negative.  Negative for dysuria, frequency and hematuria.   Musculoskeletal: Negative.  Negative for arthralgias and myalgias.   Skin: Negative.    Allergic/Immunologic: Negative.    Neurological: Negative.  Negative for headaches.   Psychiatric/Behavioral: Negative.        Objective:      Physical Exam   Constitutional: She is oriented to person, place, and time. She appears well-developed and well-nourished.   HENT:   Head: Normocephalic.   Right Ear: External ear normal.   Left Ear: External ear normal.   Nose: Nose normal.   Mouth/Throat: Oropharynx is clear and moist.   Eyes: Conjunctivae are normal. Pupils are equal, round, and reactive to light.   Neck: Normal range of motion. Neck supple.   Cardiovascular: Normal rate, regular rhythm and normal heart sounds.   Pulmonary/Chest: Effort normal and breath sounds normal.   Abdominal: Soft. Bowel sounds are normal. There is tenderness in the epigastric area. There is no rigidity, no rebound, no guarding, no CVA tenderness, no tenderness at McBurney's point and negative Concepcion's sign.   Musculoskeletal: Normal range of motion.    Neurological: She is alert and oriented to person, place, and time.   Skin: Skin is warm and dry. Capillary refill takes 2 to 3 seconds.   Psychiatric: She has a normal mood and affect. Her behavior is normal. Judgment and thought content normal.   Nursing note and vitals reviewed.      Assessment:       1. Epigastric pain    2. Chest pain, unspecified type    3.      Gastroesophageal reflux disease, esophagitis presence not specified    Plan:           Starr was seen today for abdominal pain.    Diagnoses and all orders for this visit:    Epigastric pain  Chest pain, unspecified type  Gastroesophageal reflux disease, esophagitis presence not specified  -     Occult blood x 1, stool; Future  -     H. pylori antigen, stool; Future  -     IN OFFICE EKG 12-LEAD (to Muse)  -     sucralfate (CARAFATE) 1 gram tablet; Take 1 tablet (1 g total) by mouth 4 (four) times daily. for 10 days  Report to ER immediately if symptoms worsen

## 2019-01-22 ENCOUNTER — TELEPHONE (OUTPATIENT)
Dept: FAMILY MEDICINE | Facility: CLINIC | Age: 84
End: 2019-01-22

## 2019-01-22 DIAGNOSIS — R94.31 ABNORMAL EKG: ICD-10-CM

## 2019-01-22 DIAGNOSIS — R07.9 CHEST PAIN, UNSPECIFIED TYPE: Primary | ICD-10-CM

## 2019-01-22 NOTE — TELEPHONE ENCOUNTER
----- Message from Ursula Holt NP sent at 1/22/2019  8:35 AM CST -----  EKG abnormal; recommend cardiology for further evaluation.

## 2019-01-25 ENCOUNTER — TELEPHONE (OUTPATIENT)
Dept: FAMILY MEDICINE | Facility: CLINIC | Age: 84
End: 2019-01-25

## 2019-01-25 ENCOUNTER — TELEPHONE (OUTPATIENT)
Dept: SURGERY | Facility: CLINIC | Age: 84
End: 2019-01-25

## 2019-01-25 NOTE — TELEPHONE ENCOUNTER
----- Message from Cassandra Newell sent at 1/25/2019  3:48 PM CST -----  Contact: Patricia, Daughter   Needs Advice    Reason for call: Caller states that she is returning a message from Siria        Communication Preference: [phone]    Additional Information: please contact the caller

## 2019-01-25 NOTE — TELEPHONE ENCOUNTER
----- Message from Cedrick Silverman sent at 1/25/2019  1:51 PM CST -----  Contact: nader Rosales/ [facility]  She's calling in regards to the pt being discharged for [facility] today and if the Dr will be overseeing the pt again, she can be reached at ph#  [phone]

## 2019-01-25 NOTE — TELEPHONE ENCOUNTER
Yes I willresume care-I can see her next week if she likes -or can be after her other fu appointments

## 2019-01-25 NOTE — TELEPHONE ENCOUNTER
Dr Sultana, [facility] just wants to know if you will resume care for this patient, please advise, if so will you be wanting a hospital follow up, she is going home with home health

## 2019-01-25 NOTE — TELEPHONE ENCOUNTER
Spoke with Connie at [facility] she will have patient's daughter call and schedule hosp f/u(discharge papers say 1 week)

## 2019-01-28 ENCOUNTER — PATIENT OUTREACH (OUTPATIENT)
Dept: ADMINISTRATIVE | Facility: CLINIC | Age: 84
End: 2019-01-28

## 2019-01-28 PROBLEM — K86.89 PANCREATIC MASS: Status: ACTIVE | Noted: 2019-01-23

## 2019-01-28 RX ORDER — CEFDINIR 300 MG/1
300 CAPSULE ORAL 2 TIMES DAILY
COMMUNITY
End: 2019-02-04

## 2019-01-28 RX ORDER — HYDROCODONE BITARTRATE AND ACETAMINOPHEN 7.5; 325 MG/1; MG/1
1 TABLET ORAL EVERY 6 HOURS PRN
COMMUNITY

## 2019-01-28 RX ORDER — LISINOPRIL 5 MG/1
5 TABLET ORAL DAILY
COMMUNITY
End: 2019-02-04

## 2019-01-28 RX ORDER — CARVEDILOL 12.5 MG/1
12.5 TABLET ORAL 2 TIMES DAILY WITH MEALS
COMMUNITY
End: 2019-02-04

## 2019-01-28 NOTE — PROGRESS NOTES
"  Encounter Date:  2019    Patient ID: Starr Ramos  Age:  88 y.o. :  1930     Chief Complaint   Patient presents with    Consult     History:    Ms. Ramos is a 88 y.o. female who presents with mass in the head and uncinate of pancreas.   She lives alone in [place] but is accompanied today by her daughter, Sloane, from [place] and a granddaughter.   She brought disc with images from [place].     Referred by: Dr. Blanchard    Outside records reviewed: Seen by local family practice NP with abd pain earlier this month, then admitted to [facility] via ED with obstructive jaundice and unintentional weight loss on  - 19. Underwent ERCP per Dr. Penny with stent placement.  LFTs elevated but trending down.     Had appt yesterday with Dr. Cronin (hem onc), received pathology report. Daughter states "nondiagnositic."     Weight stable. Eating well. Good appetite. Previously walking 35-40 minutes every morning at [employer], gardening, active lifestyle.     Data:     Radiology:  I personally reviewed these images with Dr. Zepeda.   19: CT chest : IMPRESSION: Reticular nodular changes are seen bilaterally involving the periphery of the lungs. Findings suspicious for atypical infection or pneumonia. Correlate. Finding not likely related to metastatic disease.     19: CT A/P: There is dilatation of the common bile duct measuring 1.4 cm. And there is mild to moderate intrahepatic bile duct dilatation. There appears to be a 2.2 cm mass involving the pancreatic head and uncinate process. There appears be mild dilatation of the pancreatic duct as well. No evidence of vessel encasement.    Endoscopy:  ERCP 19 with brushing, dilitation, and stent placement    Pathology:  18  FINAL DIAGNOSIS:  Common bile duct, brushing with tip:   -Satisfactory for evaluation.   -Negative for malignancy.    Labs:    CA 19-9 = 303  Amylase 43  Lipase 66  A1c 6.4%  Creatinine 0.69  Albumin 2.6  t bilit " 2.7        Past Medical History:   Diagnosis Date    Diverticulosis     Gallstones     Glaucoma (increased eye pressure)     Hyperlipemia     Thyroid disease      Past Surgical History:   Procedure Laterality Date    APPENDECTOMY      CATARACT EXTRACTION W/  INTRAOCULAR LENS IMPLANT      OU    CHOLECYSTECTOMY      EGD (ESOPHAGOGASTRODUODENOSCOPY) N/A 5/15/2013    Per[facility] by Vel [facility] ENDO    HYSTERECTOMY       Current Outpatient Medications on File Prior to Visit   Medication Sig Dispense Refill    albuterol 90 mcg/actuation inhaler Inhale 2 puffs into the lungs every 6 (six) hours as needed for Wheezing. Rescue 18 g 2    amitriptyline (ELAVIL) 25 MG tablet TAKE 1 TABLET IN THE EVENING 90 tablet 3    ascorbic acid (VITAMIN C) 250 MG tablet Every day      BETA-CAROTENE,A, W-C & E/MIN (OCUVITE ORAL) Every day      carvedilol (COREG) 12.5 MG tablet Take 12.5 mg by mouth 2 (two) times daily with meals.      cefdinir (OMNICEF) 300 MG capsule Take 300 mg by mouth 2 (two) times daily.      cyanocobalamin, vitamin B-12, (VITAMIN B-12) 1,000 mcg TbSR Every day      dorzolamide (TRUSOPT) 2 % ophthalmic solution Place 1 drop into both eyes 2 (two) times daily.  5    folic acid (FOLVITE) 400 MCG tablet Every day      HYDROcodone-acetaminophen (NORCO) 7.5-325 mg per tablet Take 1 tablet by mouth every 6 (six) hours as needed for Pain.      levothyroxine (SYNTHROID) 75 MCG tablet TAKE 1 TABLET EVERY DAY 90 tablet 3    lisinopril (PRINIVIL,ZESTRIL) 5 MG tablet Take 5 mg by mouth once daily.      omeprazole (PRILOSEC) 40 MG capsule TAKE 1 CAPSULE EVERY DAY 90 capsule 3    oxybutynin (DITROPAN) 5 MG Tab Take 1 tablet (5 mg total) by mouth every evening. 90 tablet 3    sucralfate (CARAFATE) 1 gram tablet Take 1 tablet (1 g total) by mouth 4 (four) times daily. for 10 days 40 tablet 0    timolol maleate 0.5% (TIMOPTIC) 0.5 % Drop Place 1 drop into both eyes 2 (two) times  daily.  0    [DISCONTINUED] acetaZOLAMIDE (DIAMOX) 500 mg CpSR Take 500 mg by mouth 2 (two) times daily.  1    [DISCONTINUED] bimatoprost (LUMIGAN) 0.03 % ophthalmic drops At bedtime      [DISCONTINUED] COMBIGAN 0.2-0.5 % Drop Place 1 drop into both eyes 2 (two) times daily.  5    [DISCONTINUED] levothyroxine (SYNTHROID) 75 MCG tablet TAKE 1 TABLET EVERY DAY 90 tablet 0    [DISCONTINUED] paroxetine (PAXIL) 10 MG tablet TAKE 1 TABLET EVERY MORNING 90 tablet 3     No current facility-administered medications on file prior to visit.      Review of patient's allergies indicates:   Allergen Reactions    Augmentin [amoxicillin-pot clavulanate] Nausea And Vomiting    Brimonidine      Other reaction(s): Swelling    Cosopt  [dorzolamide-timolol] Itching and Swelling     Other reaction(s): Eye irritation       Family History:  Her family history includes Aneurysm in her brother, mother, and sister; Heart attack (age of onset: 75) in her father; Heart attack (age of onset: 93) in her mother; Liver cancer in her mother.     Social History:  She reports that  has never smoked. she has never used smokeless tobacco. She reports that she drinks alcohol. She reports that she does not use drugs.     ROS:     Review of Systems   Constitutional: Positive for activity change, fatigue (since recent hospitalization) and unexpected weight change. Negative for appetite change and fever.   HENT: Negative for trouble swallowing.    Eyes: Positive for visual disturbance.   Respiratory: Negative for cough and shortness of breath.    Cardiovascular: Negative for chest pain and leg swelling.   Gastrointestinal: Positive for nausea. Negative for abdominal distention, abdominal pain, diarrhea and vomiting.   Genitourinary: Negative for difficulty urinating.   Neurological: Positive for weakness (since recent hospitalization).     Pertinent positive/negatives detailed in HPI, all other systems negative.     Physical Exam:  /75   Pulse  "77   Temp 97.4 °F (36.3 °C) (Oral)   Ht 5' 6" (1.676 m)   Wt 67.3 kg (148 lb 5.9 oz)   BMI 23.95 kg/m²     Physical Exam  Constitutional:  Non-toxic, no acute distress.  Performance status: ECOG 1-2  Eyes:  Sclerae anicteric, gaze symmetrical  Neck:  Trachea midline, thyroid, non enlarged without palpable nodules,  FROM  Resp:  Even and unlabored resp, CTA anterior bilaterally  CV:  Regular pulse, distant S1, S2, no murmurs, no rubs, no edema  Abd:  Soft, non-tender, no masses, no hepatosplenomegaly, no ascites, no superficial varices  Lymphatics:  No cervical, supraclavicular lymphadenopathy  Musculoskeletal:  Ambulatory, no muscle wasting  Neuro:  No gross deficits  Psych:  Awake, alert, oriented.  Answers and asks questions appropriately      ICD-10-CM ICD-9-CM    1. Pancreatic mass K86.9 577.9      Plan:  Dr. Zepeda discussed mass is clearly resectable with Whipple, open vs. Robotic. As she is a marginal candidate for surgery, would benefit from aggressive prehab. Obtain  strength and TUG today as baseline.   They may seek second opinion with [facility] surgeon this Friday.       Pt seen with Dr. Zepeda today.       Iqra Stephens NP  Surgical Oncology  [facility] [place], [place]  Office: [phone]  Fax: [phone]         Starr Ramos 2/16/1930           "

## 2019-01-28 NOTE — PATIENT INSTRUCTIONS
Acetaminophen; Hydrocodone tablets or capsules  What is this medicine?  ACETAMINOPHEN; HYDROCODONE (a set a MERON alicia fen; michele droe KOE done) is a pain reliever. It is used to treat moderate to severe pain.  How should I use this medicine?  Take this medicine by mouth with a glass of water. Follow the directions on the prescription label. You can take it with or without food. If it upsets your stomach, take it with food. Do not take your medicine more often than directed.  A special MedGuide will be given to you by the pharmacist with each prescription and refill. Be sure to read this information carefully each time.  Talk to your pediatrician regarding the use of this medicine in children. Special care may be needed.  What side effects may I notice from receiving this medicine?  Side effects that you should report to your doctor or health care professional as soon as possible:  · allergic reactions like skin rash, itching or hives, swelling of the face, lips, or tongue  · breathing problems  · confusion  · redness, blistering, peeling or loosening of the skin, including inside the mouth  · signs and symptoms of low blood pressure like dizziness; feeling faint or lightheaded, falls; unusually weak or tired  · trouble passing urine or change in the amount of urine  · yellowing of the eyes or skin  Side effects that usually do not require medical attention (report to your doctor or health care professional if they continue or are bothersome):  · constipation  · dry mouth  · nausea, vomiting  · tiredness  What may interact with this medicine?  This medicine may interact with the following medications:  · alcohol  · antiviral medicines for HIV or AIDS  · atropine  · antihistamines for allergy, cough and cold  · certain antibiotics like erythromycin, clarithromycin  · certain medicines for anxiety or sleep  · certain medicines for bladder problems like oxybutynin, tolterodine  · certain medicines for depression like  amitriptyline, fluoxetine, sertraline  · certain medicines for fungal infections like ketoconazole and itraconazole  · certain medicines for Parkinson's disease like benztropine, trihexyphenidyl  · certain medicines for seizures like carbamazepine, phenobarbital, phenytoin, primidone  · certain medicines for stomach problems like dicyclomine, hyoscyamine  · certain medicines for travel sickness like scopolamine  · general anesthetics like halothane, isoflurane, methoxyflurane, propofol  · ipratropium  · local anesthetics like lidocaine, pramoxine, tetracaine  · MAOIs like Carbex, Eldepryl, Marplan, Nardil, and Parnate  · medicines that relax muscles for surgery  · other medicines with acetaminophen  · other narcotic medicines for pain or cough  · phenothiazines like chlorpromazine, mesoridazine, prochlorperazine, thioridazine  · rifampin  What if I miss a dose?  If you miss a dose, take it as soon as you can. If it is almost time for your next dose, take only that dose. Do not take double or extra doses.  Where should I keep my medicine?  Keep out of the reach of children. This medicine can be abused. Keep your medicine in a safe place to protect it from theft. Do not share this medicine with anyone. Selling or giving away this medicine is dangerous and against the law.  This medicine may cause accidental overdose and death if it taken by other adults, children, or pets. Mix any unused medicine with a substance like cat litter or coffee grounds. Then throw the medicine away in a sealed container like a sealed bag or a coffee can with a lid. Do not use the medicine after the expiration date.  Store at room temperature between 15 and 30 degrees C (59 and 86 degrees F).  What should I tell my health care provider before I take this medicine?  They need to know if you have any of these conditions:  · brain tumor  · Crohn's disease, inflammatory bowel disease, or ulcerative colitis  · drug abuse or addiction  · head  injury  · heart or circulation problems  · if you often drink alcohol  · kidney disease or problems going to the bathroom  · liver disease  · lung disease, asthma, or breathing problems  · an unusual or allergic reaction to acetaminophen, hydrocodone, other opioid analgesics, other medicines, foods, dyes, or preservatives  · pregnant or trying to get pregnant  · breast-feeding  What should I watch for while using this medicine?  Tell your doctor or health care professional if your pain does not go away, if it gets worse, or if you have new or a different type of pain. You may develop tolerance to the medicine. Tolerance means that you will need a higher dose of the medicine for pain relief. Tolerance is normal and is expected if you take the medicine for a long time.  Do not suddenly stop taking your medicine because you may develop a severe reaction. Your body becomes used to the medicine. This does NOT mean you are addicted. Addiction is a behavior related to getting and using a drug for a non-medical reason. If you have pain, you have a medical reason to take pain medicine. Your doctor will tell you how much medicine to take. If your doctor wants you to stop the medicine, the dose will be slowly lowered over time to avoid any side effects.  There are different types of narcotic medicines (opiates). If you take more than one type at the same time or if you are taking another medicine that also causes drowsiness, you may have more side effects. Give your health care provider a list of all medicines you use. Your doctor will tell you how much medicine to take. Do not take more medicine than directed. Call emergency for help if you have problems breathing or unusual sleepiness.  Do not take other medicines that contain acetaminophen with this medicine. Always read labels carefully. If you have questions, ask your doctor or pharmacist.  If you take too much acetaminophen get medical help right away. Too much  acetaminophen can be very dangerous and cause liver damage. Even if you do not have symptoms, it is important to get help right away.  You may get drowsy or dizzy. Do not drive, use machinery, or do anything that needs mental alertness until you know how this medicine affects you. Do not stand or sit up quickly, especially if you are an older patient. This reduces the risk of dizzy or fainting spells. Alcohol may interfere with the effect of this medicine. Avoid alcoholic drinks.  The medicine will cause constipation. Try to have a bowel movement at least every 2 to 3 days. If you do not have a bowel movement for 3 days, call your doctor or health care professional.  Your mouth may get dry. Chewing sugarless gum or sucking hard candy, and drinking plenty of water may help. Contact your doctor if the problem does not go away or is severe.  NOTE:This sheet is a summary. It may not cover all possible information. If you have questions about this medicine, talk to your doctor, pharmacist, or health care provider. Copyright© 2017 [employer]

## 2019-01-30 ENCOUNTER — INITIAL CONSULT (OUTPATIENT)
Dept: SURGERY | Facility: CLINIC | Age: 84
End: 2019-01-30
Payer: COMMERCIAL

## 2019-01-30 VITALS
DIASTOLIC BLOOD PRESSURE: 75 MMHG | WEIGHT: 148.38 LBS | HEIGHT: 66 IN | HEART RATE: 77 BPM | SYSTOLIC BLOOD PRESSURE: 135 MMHG | BODY MASS INDEX: 23.84 KG/M2 | TEMPERATURE: 97 F

## 2019-01-30 DIAGNOSIS — K86.89 PANCREATIC MASS: ICD-10-CM

## 2019-01-30 PROCEDURE — 99999 PR PBB SHADOW E&M-EST. PATIENT-LVL IV: CPT | Mod: PBBFAC,,, | Performed by: NURSE PRACTITIONER

## 2019-01-30 PROCEDURE — 99204 PR OFFICE/OUTPT VISIT, NEW, LEVL IV, 45-59 MIN: ICD-10-PCS | Mod: S$GLB,,, | Performed by: NURSE PRACTITIONER

## 2019-01-30 PROCEDURE — 99204 OFFICE O/P NEW MOD 45 MIN: CPT | Mod: S$GLB,,, | Performed by: NURSE PRACTITIONER

## 2019-01-30 PROCEDURE — 99999 PR PBB SHADOW E&M-EST. PATIENT-LVL IV: ICD-10-PCS | Mod: PBBFAC,,, | Performed by: NURSE PRACTITIONER

## 2019-01-30 RX ORDER — NEOMYCIN SULFATE, POLYMYXIN B SULFATE, AND DEXAMETHASONE 3.5; 10000; 1 MG/G; [USP'U]/G; MG/G
OINTMENT OPHTHALMIC
COMMUNITY
Start: 2019-01-29

## 2019-01-30 NOTE — PROGRESS NOTES
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 Stent placed and her jaundice has resolved. 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   She appears rather frail to me, requiring moderate assistance to get on the exam table. I think she is, at best, a marginal candidate for Whipple resection. Successful completion of Prehab, in my opinion, would be a necessary pre-requisite to proceeding with surgery.  Long talk with the patient and her daughter and granddaughter. Multiple questions answered. We also discussed robotic Whipple and I've told her that (if we get past Prehab) I would be willing to use a robotic approach but that, in our experience, it adds about two hours of operative time and has not made a significant difference in the postop LOS or recovery process.     They want to think about it and will let me know    Copy Santo Davis Reina

## 2019-01-30 NOTE — LETTER
January 30, 2019        Seng Blanchard MD  [address]  [address]  [facility] [address]             [facility]  [address]  [place] [address]  Phone: [phone]   Patient: Starr Ramos   MR Number: [id number]   YOB: 1930   Date of Visit: 1/30/2019       Dear Dr. Blanchard:    Thank you for referring Starr Ramso to me for evaluation. Attached you will find relevant portions of my assessment and plan of care.    If you have questions, please do not hesitate to call me. I look forward to following Starr Ramos along with you.    Sincerely,      Ori Zepeda MD            CC  No Recipients    Enclosure

## 2019-01-30 NOTE — LETTER
January 30, 2019      Seng Blanchard MD  [address]  [address]  [facility] [address]           [facility]  [address]  [place] [address]  Phone: [phone]          Patient: Starr Ramos   MR Number: [id number]   YOB: 1930   Date of Visit: 1/30/2019       Dear Dr. Seng Blanchard:    Thank you for referring Starr Ramos to me for evaluation. Attached you will find relevant portions of my assessment and plan of care.    If you have questions, please do not hesitate to call me. I look forward to following Starr Ramos along with you.    Sincerely,    JONATHON Salmon,ANP-C    Enclosure  CC:  No Recipients    If you would like to receive this communication electronically, please contact [email or web address] or [phone] to request more information on [employer] Link access.    For providers and/or their staff who would like to refer a patient to [facility], please contact us through our one-stop-shop provider referral line, [facility], at [phone].    If you feel you have received this communication in error or would no longer like to receive these types of communications, please e-mail [email or web address]

## 2019-02-04 ENCOUNTER — OFFICE VISIT (OUTPATIENT)
Dept: FAMILY MEDICINE | Facility: CLINIC | Age: 84
End: 2019-02-04
Payer: MEDICARE

## 2019-02-04 VITALS — BODY MASS INDEX: 23.3 KG/M2 | WEIGHT: 145 LBS | HEIGHT: 66 IN

## 2019-02-04 DIAGNOSIS — K86.89 PANCREATIC MASS: Primary | ICD-10-CM

## 2019-02-04 PROCEDURE — 99213 PR OFFICE/OUTPT VISIT, EST, LEVL III, 20-29 MIN: ICD-10-PCS | Mod: S$PBB,,, | Performed by: FAMILY MEDICINE

## 2019-02-04 PROCEDURE — 99999 PR PBB SHADOW E&M-EST. PATIENT-LVL I: ICD-10-PCS | Mod: PBBFAC,,, | Performed by: FAMILY MEDICINE

## 2019-02-04 PROCEDURE — 99211 OFF/OP EST MAY X REQ PHY/QHP: CPT | Mod: PBBFAC,PO | Performed by: FAMILY MEDICINE

## 2019-02-04 PROCEDURE — 99999 PR PBB SHADOW E&M-EST. PATIENT-LVL I: CPT | Mod: PBBFAC,,, | Performed by: FAMILY MEDICINE

## 2019-02-04 PROCEDURE — 99213 OFFICE O/P EST LOW 20 MIN: CPT | Mod: S$PBB,,, | Performed by: FAMILY MEDICINE

## 2019-02-04 RX ORDER — LANCETS
EACH MISCELLANEOUS
Qty: 200 EACH | Refills: 4 | Status: SHIPPED | OUTPATIENT
Start: 2019-02-04 | End: 2019-02-04 | Stop reason: SDUPTHER

## 2019-02-04 RX ORDER — DICYCLOMINE HYDROCHLORIDE 10 MG/1
10 CAPSULE ORAL
Qty: 60 CAPSULE | Refills: 6 | Status: SHIPPED | OUTPATIENT
Start: 2019-02-04 | End: 2019-03-06

## 2019-02-04 RX ORDER — INSULIN PUMP SYRINGE, 3 ML
EACH MISCELLANEOUS
Qty: 1 EACH | Refills: 1 | Status: SHIPPED | OUTPATIENT
Start: 2019-02-04 | End: 2019-02-06 | Stop reason: SDUPTHER

## 2019-02-04 RX ORDER — INSULIN PUMP SYRINGE, 3 ML
EACH MISCELLANEOUS
Qty: 1 EACH | Refills: 1 | Status: SHIPPED | OUTPATIENT
Start: 2019-02-04 | End: 2019-02-04 | Stop reason: SDUPTHER

## 2019-02-04 RX ORDER — LANCETS
EACH MISCELLANEOUS
Qty: 200 EACH | Refills: 4 | Status: SHIPPED | OUTPATIENT
Start: 2019-02-04 | End: 2019-02-06 | Stop reason: SDUPTHER

## 2019-02-04 NOTE — PROGRESS NOTES
Transitional Care Note  Subjective:       Patient ID: Starr Ramos is a 88 y.o. female.  Chief Complaint: No chief complaint on file.    Family and/or Caretaker present at visit?  Yes.  Diagnostic tests reviewed/disposition: I have reviewed all completed as well as pending diagnostic tests at the time of discharge.  Disease/illness education: Pancreatic mass  Home health/community services discussion/referrals: Patient does not have home health established from hospital visit.  They do not need home health.  If needed, we will set up home health for the patient.   Establishment or re-establishment of referral orders for community resources: No other necessary community resources.   Discussion with other health care providers: No discussion with other health care providers necessary.   HPI  Review of Systems    Objective:      Physical Exam    Assessment:       No diagnosis found.    Plan:               Past Medical History:   Diagnosis Date    Diverticulosis     Gallstones     Glaucoma (increased eye pressure)     Hyperlipemia     Thyroid disease      Past Surgical History:   Procedure Laterality Date    APPENDECTOMY      CATARACT EXTRACTION W/  INTRAOCULAR LENS IMPLANT      OU    CHOLECYSTECTOMY      EGD (ESOPHAGOGASTRODUODENOSCOPY) N/A 5/15/2013    Performed by Vel Prince Jr., [facility] at [facility] ENDO    HYSTERECTOMY       Review of patient's allergies indicates:   Allergen Reactions    Augmentin [amoxicillin-pot clavulanate] Nausea And Vomiting    Brimonidine      Other reaction(s): Swelling    Cosopt  [dorzolamide-timolol] Itching and Swelling     Other reaction(s): Eye irritation     Current Outpatient Medications on File Prior to Visit   Medication Sig Dispense Refill    albuterol 90 mcg/actuation inhaler Inhale 2 puffs into the lungs every 6 (six) hours as needed for Wheezing. Rescue 18 g 2    amitriptyline (ELAVIL) 25 MG tablet TAKE 1 TABLET IN THE EVENING 90 tablet 3    ascorbic acid  (VITAMIN C) 250 MG tablet Every day      BETA-CAROTENE,A, W-C & E/MIN (OCUVITE ORAL) Every day      carvedilol (COREG) 12.5 MG tablet Take 12.5 mg by mouth 2 (two) times daily with meals.      cefdinir (OMNICEF) 300 MG capsule Take 300 mg by mouth 2 (two) times daily.      cyanocobalamin, vitamin B-12, (VITAMIN B-12) 1,000 mcg TbSR Every day      dorzolamide (TRUSOPT) 2 % ophthalmic solution Place 1 drop into both eyes 2 (two) times daily.  5    folic acid (FOLVITE) 400 MCG tablet Every day      HYDROcodone-acetaminophen (NORCO) 7.5-325 mg per tablet Take 1 tablet by mouth every 6 (six) hours as needed for Pain.      levothyroxine (SYNTHROID) 75 MCG tablet TAKE 1 TABLET EVERY DAY 90 tablet 3    lisinopril (PRINIVIL,ZESTRIL) 5 MG tablet Take 5 mg by mouth once daily.      neomycin-polymyxin-dexamethasone (DEXACINE) 3.5 mg/g-10,000 unit/g-0.1 % Oint SMALL RIBBON OF OINTMENT TO BOTH UPPER AND LOWER EYELID BILATERALLY 3-4 X PER DAY      omeprazole (PRILOSEC) 40 MG capsule TAKE 1 CAPSULE EVERY DAY 90 capsule 3    oxybutynin (DITROPAN) 5 MG Tab Take 1 tablet (5 mg total) by mouth every evening. 90 tablet 3    timolol maleate 0.5% (TIMOPTIC) 0.5 % Drop Place 1 drop into both eyes 2 (two) times daily.  0     No current facility-administered medications on file prior to visit.      Social History     Socioeconomic History    Marital status:      Spouse name: Not on file    Number of children: Not on file    Years of education: Not on file    Highest education level: Not on file   Social Needs    Financial resource strain: Not on file    Food insecurity - worry: Not on file    Food insecurity - inability: Not on file    Transportation needs - medical: Not on file    Transportation needs - non-medical: Not on file   Occupational History    Not on file   Tobacco Use    Smoking status: Never Smoker    Smokeless tobacco: Never Used   Substance and Sexual Activity    Alcohol use: Yes     Comment:  rare    Drug use: No    Sexual activity: Not on file   Other Topics Concern    Not on file   Social History Narrative    Not on file     Family History   Problem Relation Age of Onset    Aneurysm Mother     Heart attack Mother 93    Liver cancer Mother     Aneurysm Sister     Aneurysm Brother     Heart attack Father 75    Diabetes Neg Hx     Hypertension Neg Hx     Stroke Neg Hx        ROS:  SKIN: No rashes, itching or changes in color or texture of skin.  EYES: Visual acuity fine. No photophobia, ocular pain or diplopia.EARS: Denies ear pain, discharge or vertigo.NOSE: No loss of smell, no epistaxis or postnasal drip.MOUTH & THROAT: No hoarseness or change in voice. No excessive gum bleeding.NODES: Denies swollen glands.  CHEST: Denies FERNANDEZ, cyanosis, wheezing, cough and sputum production.  CARDIOVASCULAR: Denies chest pain, PND, orthopnea or reduced exercise tolerance.  ABDOMEN:  No weight loss.Mild abdominal pain, no hematemesis or blood in stool.  URINARY: No flank pain, dysuria or hematuria.  PERIPHERAL VASCULAR: No claudication or cyanosis.  MUSCULOSKELETAL: Negative   NEUROLOGIC: No history of seizures, paralysis, alteration of gait or coordination.    PE Vital signs noted  Heent: Normocephalic,with no recent trauma,PERRLA,EOMI,conjunctiva clear with no exudate.Nasopharynx is not injected .Otic canal.TMs are not affected.Pharynx is slightly red.   Neck:Supple without adenopathy  Chest:Clear bilateral breath sounds normal  Heart:Regular rhthym without murmer  Abdomen:Soft, mild generalized tenderness,no rebound,no masses, no hepatosplenomegaly  Extremeties and Neurologic:Grossly within normal limits      DC Summary copied [facility] DISCHARGE SUMMARY  Patient ID:  Starr Ramos  [id number]  88 y.o.  2/16/1930    Admit Date:   1/22/2019 9:13 AM    Discharge Date:   Discharge Today: 1/25/2019    Admitting Physician:   Christo Kramer     Discharge Physician:   NARCISO LOMELI  MD    Consults:  Consultants   Provider Service Role Specialty   Aristides, Ondina, NP -- Nurse Practitioner Nurse Practitioner Abdullahi Rangel MD Gastroenterology Consulting Physician Gastroenterology   Sukhwinder Cronin MD Oncology Consulting Physician Hematology and Oncology       Reason for Admission/Admission Diagnoses:   Present on Admission:   Pancreatic mass   Hypothyroidism   Transaminitis   Urinary tract infection with hematuria   Obstructive jaundice   Acute hyperglycemia   Elevated serum alkaline phosphatase level   Sensorineural hearing loss (SNHL) of both ears    Discharge Diagnoses:   Active Hospital Problems   Diagnosis Date Noted    Pancreatic mass 01/22/2019    Sensorineural hearing loss (SNHL) of both ears 01/23/2019   Chronic    Transaminitis 01/22/2019    Urinary tract infection with hematuria 01/22/2019    Obstructive jaundice 01/22/2019    Acute hyperglycemia 01/22/2019    Elevated serum alkaline phosphatase level 01/22/2019    Primary open angle glaucoma 11/07/2014   Chronic    Hypothyroidism 04/20/2013   Chronic     Resolved Hospital Problems     History of Present Illness  HISTORY OF THE PRESENT ILLNESS: Starr Ramos is a 88 y.o. female who presents to the [facility] ER with chief complaint of epigastric pain. History obtained from the patient. PMH is listed below. She developed epigastric pain 3 days ago and it has progressively worsened. The pain will occasionally radiate into her chest. She has associated jaundice and unintentional wt loss (pt unable to quantify). She was evaluated by her PCP yesterday and was prescribed Carafate without any improvement. She also c/o hematuria that began today. She denies any fever, chills, sob, cough, anorexia, nausea, vomiting, diarrhea, melena, or dysuria    Hospital Course and Treatment:   Admission Information   Date & Time  1/22/2019 Provider  Terry Shirley MD Department  [facility] Medicine Dept. Phone  [phone]        Patient came in with abdominal pain jaundice was evaluated and found to have a pancreatic mass. She had ERCP with replacement. Biopsy result in progress. She is to follow up with gastroenterology, oncology PCP. She is clinically stable otherwise.    I discussed with the patient disease process and treatment.     I have personally seen and examined the patient, Starr Ramos, in a face to face encounter on the date of discharge.     She is cleared for discharge with instructions to follow up as directed.   Total time in the care and discharge planning of this patient was greater than 30 minutes.    Significant Diagnostic Studies:  Recent Imaging and Procedure Results   Procedure Component Value Ref Range Date/Time   Urine culture [[id number]] (Abnormal) (Susceptibility) Collected: 01/22/2019 1145   Specimen: N/A from Urine CC Updated: 01/24/2019 0657   Micro Culture Result ,000 CFU/mL   Urine Culture Result Escherichia coli   Micro Culture Result ,000 CFU/mL   Susceptibility   Escherichia coli (1)   Antibiotic Interpretation Method Status   Ciprofloxacin Resistant MINIMAL INHIBITORY CONCENTRATION   Nitrofurantoin Sensitive MINIMAL INHIBITORY CONCENTRATION   Trimeth-Sulfa Resistant MINIMAL INHIBITORY CONCENTRATION   Cefazolin Intermediate MINIMAL INHIBITORY CONCENTRATION   Ceftriaxone Sensitive MINIMAL INHIBITORY CONCENTRATION   Cefepime Sensitive MINIMAL INHIBITORY CONCENTRATION   Gentamicin Sensitive MINIMAL INHIBITORY CONCENTRATION   Levofloxacin Resistant MINIMAL INHIBITORY CONCENTRATION   Ampicillin/Sulbactam Resistant MINIMAL INHIBITORY CONCENTRATION   ESBL Neg MINIMAL INHIBITORY CONCENTRATION   Meropenem Sensitive MINIMAL INHIBITORY CONCENTRATION   Amox/KAug Intermediate MINIMAL INHIBITORY CONCENTRATION   Piperacillin/James Intermediate MINIMAL INHIBITORY CONCENTRATION             FL ERCP Biliary Only [[id number]] Collected: 01/23/2019 1405   Updated: 01/23/2019 1422   Narrative:   REASON FOR  EXAM: ERCP     TECHNICAL FACTORS: 3 C-arm fluoroscopic images     DOSE: 46 mL Isovue-300    FLUOROSCOPY TIME: 415.7 seconds    COMPARISON: CT chest abdomen pelvis 01/22/2019    FINDINGS: Initial images demonstrate a long stricture of the distal common duct with endoscopic catheter within the stricture. Immediately above the stricture, there is a moderately dilated segment of common duct which extends up to the level of the   cholecystectomy clip. Above the level of the surgical clip, there is mild narrowing and irregularity of the proximal common duct with opacification of what are presumably anterior branches of the right hepatic duct with the patient presumably an CERVANTES   position on the table. On the final film obtained in the lateral projection, contrast is layering and posterior branches of what is probably the right hepatic duct with the patient presumably in supine position. The left hepatic duct and its distal   radicals are not identified with certainty. It is unclear if this reflects dependent layering of contrast into the right hepatic ducts or stricture of the left hepatic duct.     IMPRESSION:  1. Distal common duct obstruction probably secondary to a mass in the head of the pancreas.  2. Moderate dilatation of the common duct above the pancreatic head mass with abrupt tapering of the dilated common duct just above a cholecystectomy clip. There is no definite opacification of the left hepatic duct of uncertain significance.   Metastatic disease in the cale hepatis with associated ductal stricture cannot be excluded on this limited study.        Electronically signed by Gagandeep Marin MD on 1/23/2019 2:19 PM    CT Chest Abdomen Pelvis W Contrast [[id number]] Collected: 01/22/2019 1152   Updated: 01/22/2019 1201   Narrative:   EXAM: CT CHEST ABDOMEN PELVIS W CONTRAST    HISTORY: Jaundice, epigastric pain    TECHNIQUE: Axial images were acquired. Patient was given IV contrast     COMPARISON: CT scan  8/11/2005.    Chest findings:    There are reticular nodular infiltrates identified bilaterally predominantly in the subpleural regions of the lungs. Findings favor atypical infection or pneumonia. Metastatic disease felt to be less likely.     No evidence of mediastinal or hilar adenopathy. No pleural effusions.     The heart is normal in size. No pericardial effusion.    Skeletal structures are intact.    TECHNIQUE: Axial images were acquired after administration of IV contrast. Examination was performed in conjunction with a CT scan the chest.    Abdomen and pelvis findings:    No definite liver masses. Focal fatty infiltration suspected near the falciform ligament. Patient had interval cholecystectomy. There is dilatation of the common bile duct measuring 1.4 cm. And there is mild to moderate intrahepatic bile duct dilatation.  There appears to be a 2.2 cm mass involving the pancreatic head and uncinate process. There appears be mild dilatation of the pancreatic duct as well. No evidence of vessel encasement. No ascites.        Spleen appears normal.     No definite adenopathy.    The bowel appears normal. No ascites.    There are no abnormal masses or fluid collections seen in the pelvis. The bladder is distended. There is air noted in the bladder which could be related to traction or recent catheterization.    No evidence of bone metastasis. There does appear to be degenerative changes with anterior subluxation at L4-5.    IMPRESSION: Reticular nodular changes are seen bilaterally involving the periphery of the lungs. Findings suspicious for atypical infection or pneumonia. Correlate. Finding not likely related to metastatic disease.    There is a 2.2 cm mass involving the pancreatic head is for pancreatic cancer causing dilatation of the bile ducts as well as the pancreatic duct. No definite evidence of local invasion or metastatic disease.    The bladder is distended containing a small amount of air which  could be related to infection or recent catheterization.    All CT scans at [this location] are performed using dose modulation techniques as appropriate to a performed exam including the following: automated exposure control; adjustment of the mA and/or kV according to patient size (this includes techniques or   standardized protocols for targeted exams where dose is matched to indication / reason for exam; i.e. extremities or head); use of iterative reconstruction technique.     Finalized on: 1/22/2019 11:59 AM By: Abel Rios MD   [facility]# [id number] 2019-01-22 12:01:13.080 [facility]       Surgical Procedures during this visit:    Procedure(s):  ENDOSCOPIC RETROGRADE CHOLANGIOPANCREATOGRAPHY with brushing, dilitation, and stent placement  Date  1/23/2019  Primary Surgeon  Zelalem Penny MD  -------------------    Patient's Condition On Discharge:   Stable    Physical Exam   HENT:   Head: Normocephalic and atraumatic.   Neck: Normal range of motion.   Cardiovascular: Normal rate, regular rhythm, normal heart sounds and intact distal pulses.   Pulmonary/Chest: Effort normal and breath sounds normal. She has no rales. She exhibits no tenderness.   Abdominal: Soft. She exhibits mass. There is no tenderness. There is no rebound and no guarding.   Musculoskeletal: Normal range of motion.   Skin: Skin is warm and dry. No rash noted. No erythema.     Discharge Disposition:   Order for Discharge (From admission, onward)   Start Ordered   01/25/19 1216 Discharge Disposition to: Home Health Once &nbsp;   Question: Discharge Disposition to Answer: Home Health   Start Status   01/25/19 1216 Completed Order ID: [id number]     01/25/19 1221   01/25/19 0000 Follow-up with: [facility]; Schedule for: 1; Weeks &nbsp;   Question Answer Comment   with [facility]   Schedule for 1   when Weeks     01/25/19 1221   01/25/19 0000 Follow-up with PCP Schedule for: 1; Weeks &nbsp;   Question Answer Comment   Schedule for 1   when Weeks      01/25/19 1221         Discharge Orders:    Immunizations Administered for This Admission   No immunizations given this admission.         Medication List     START taking these medications   carvedilol 12.5 MG tablet  Quantity: 60 tablet  Commonly known as: COREG  Take 1 tablet (12.5 mg total) by mouth 2 (two) times daily with meals      cefdinir 300 MG capsule  Quantity: 14 capsule  Commonly known as: OMNICEF  Take 1 capsule (300 mg total) by mouth 2 (two) times daily      HYDROcodone-acetaminophen 7.5-325 mg per tablet  Quantity: 80 tablet  Commonly known as: NORCO  Take 1 tablet by mouth every 6 (six) hours as needed      lisinopril 5 MG tablet  Quantity: 30 tablet  Commonly known as: PRINIVIL,ZESTRIL  Take 1 tablet (5 mg total) by mouth daily  Start taking on: 1/26/2019        CONTINUE taking these medications   amitriptyline 25 MG tablet  Commonly known as: ELAVIL  Take 25 mg by mouth nightly.      dorzolamide 2 % eye drops  Quantity: 10 mL  Commonly known as: TRUSOPT  Place 1 drop into both eyes 2 (two) times daily.      levothyroxine 75 MCG tablet  Commonly known as: SYNTHROID, LEVOTHROID  Take 75 mcg by mouth daily.      omeprazole 40 MG capsule  Quantity: 30 capsule  Commonly known as: PriLOSEC  Take 1 capsule (40 mg total) by mouth daily.      timolol 0.5 % ophthalmic solution  Commonly known as: BETIMOL  Place 1 drop into both eyes 2 (two) times daily        STOP taking these medications   PARoxetine 10 MG tablet  Commonly known as: PAXIL          Where to Get Your Medications     These medications were sent to [facility]/pharmacy #[id number] - [place] - [address] [facility], [place], [facility] [address] [place] [address]   Phone: [phone]   · carvedilol 12.5 MG tablet  · cefdinir 300 MG capsule  · lisinopril 5 MG tablet    You can get these medications from any pharmacy   Bring a paper prescription for each of these medications  · HYDROcodone-acetaminophen 7.5-325 mg per  tablet      Discharge Orders   Future Labs/Procedures Expected by Expires   Activity as tolerated As directed   Diet (Select type) Regular; please send ASAP As directed   Questions:   Diet Type: Regular   Other Restriction(s):   Liquid Consistency:   Sodium Restriction:   Fluid restriction:   Preferences: please send ASAP   Follow-up with PCP Schedule for: 1; Weeks As directed   Scheduling Instructions:   F/u with GI in 2 weeks   Questions:   Schedule for: 1   when: Weeks   Follow-up with: [facility]; Schedule for: 1; Weeks As directed   Questions:   with: [facility]   Schedule for: 1   when: Weeks

## 2019-02-05 ENCOUNTER — TELEPHONE (OUTPATIENT)
Dept: FAMILY MEDICINE | Facility: CLINIC | Age: 84
End: 2019-02-05

## 2019-02-05 NOTE — TELEPHONE ENCOUNTER
----- Message from Tori Vicenteite sent at 2/5/2019  2:34 PM CST -----  Contact: Melissa with [facility]   Melissa called and stated she is checking the status of a DME request she faxed on 1/28/19. She can be reached at (ph) [phone] (fax) [phone].    Thanks,  TF

## 2019-02-06 RX ORDER — LANCETS
EACH MISCELLANEOUS
Qty: 100 EACH | Refills: 4 | Status: SHIPPED | OUTPATIENT
Start: 2019-02-06

## 2019-02-06 RX ORDER — INSULIN PUMP SYRINGE, 3 ML
EACH MISCELLANEOUS
Qty: 1 EACH | Refills: 1 | Status: SHIPPED | OUTPATIENT
Start: 2019-02-06 | End: 2020-02-06

## 2019-02-20 RX ORDER — TRIAMCINOLONE ACETONIDE 0.25 MG/G
CREAM TOPICAL
Qty: 15 G | Refills: 12 | Status: SHIPPED | OUTPATIENT
Start: 2019-02-20

## 2019-03-15 ENCOUNTER — TELEPHONE (OUTPATIENT)
Dept: FAMILY MEDICINE | Facility: CLINIC | Age: 84
End: 2019-03-15

## 2019-03-15 NOTE — TELEPHONE ENCOUNTER
Informed Pt's son-in-law the caregiver that they would have to call [facility] to cancel any further care due to the pt moving to a different state.

## 2019-03-15 NOTE — TELEPHONE ENCOUNTER
----- Message from Michelle Dykes sent at 3/15/2019 10:14 AM CDT -----  Contact: Matt-Son In Law  Mr. Gutierrez requesting a call back regarding patient's home health scheduled here in [place]. Patient is now in [place] and needs to be released from the Home Health here in order to receive care in [place]. Please call back at [phone].      Thanks,  Michelle Dykes

## 2019-03-28 ENCOUNTER — TELEPHONE (OUTPATIENT)
Dept: ADMINISTRATIVE | Facility: CLINIC | Age: 84
End: 2019-03-28

## 2019-04-06 RX ORDER — AMITRIPTYLINE HYDROCHLORIDE 25 MG/1
TABLET, FILM COATED ORAL
Qty: 90 TABLET | Refills: 3 | Status: SHIPPED | OUTPATIENT
Start: 2019-04-06

## 2019-04-06 NOTE — TELEPHONE ENCOUNTER
Dont call early-Confirm if she wants me to fill this and if it needs to go to a different pharmacy